# Patient Record
Sex: MALE | Race: WHITE | NOT HISPANIC OR LATINO | Employment: FULL TIME | URBAN - METROPOLITAN AREA
[De-identification: names, ages, dates, MRNs, and addresses within clinical notes are randomized per-mention and may not be internally consistent; named-entity substitution may affect disease eponyms.]

---

## 2021-08-02 ENCOUNTER — LAB REQUISITION (OUTPATIENT)
Dept: LAB | Facility: CLINIC | Age: 31
End: 2021-08-02

## 2021-08-02 PROCEDURE — 86481 TB AG RESPONSE T-CELL SUSP: CPT | Performed by: INTERNAL MEDICINE

## 2021-08-04 LAB
GAMMA INTERFERON BACKGROUND BLD IA-ACNC: 0.41 IU/ML
M TB IFN-G BLD-IMP: NEGATIVE
M TB IFN-G CD4+ BCKGRND COR BLD-ACNC: 9.59 IU/ML
MITOGEN IGNF BCKGRD COR BLD-ACNC: 0.14 IU/ML
MITOGEN IGNF BCKGRD COR BLD-ACNC: 0.2 IU/ML
QUANTIFERON MITOGEN: 10 IU/ML
QUANTIFERON NIL TUBE: 0.41 IU/ML
QUANTIFERON TB1 TUBE: 0.55 IU/ML
QUANTIFERON TB2 TUBE: 0.61

## 2022-08-28 ENCOUNTER — HOSPITAL ENCOUNTER (EMERGENCY)
Facility: CLINIC | Age: 32
Discharge: HOME | End: 2022-08-29
Attending: INTERNAL MEDICINE | Admitting: INTERNAL MEDICINE
Payer: COMMERCIAL

## 2022-08-28 DIAGNOSIS — R00.2 PALPITATIONS: ICD-10-CM

## 2022-08-28 LAB
ALBUMIN SERPL BCG-MCNC: 4.9 G/DL (ref 3.5–5.2)
ALP SERPL-CCNC: 59 U/L (ref 40–129)
ALT SERPL W P-5'-P-CCNC: 33 U/L (ref 10–50)
ANION GAP SERPL CALCULATED.3IONS-SCNC: 10 MMOL/L (ref 7–15)
AST SERPL W P-5'-P-CCNC: 29 U/L (ref 10–50)
BASOPHILS # BLD AUTO: 0 10E3/UL (ref 0–0.2)
BASOPHILS NFR BLD AUTO: 0 %
BILIRUB SERPL-MCNC: 0.3 MG/DL
BUN SERPL-MCNC: 12.6 MG/DL (ref 6–20)
CALCIUM SERPL-MCNC: 10 MG/DL (ref 8.6–10)
CHLORIDE SERPL-SCNC: 103 MMOL/L (ref 98–107)
CREAT SERPL-MCNC: 1.22 MG/DL (ref 0.67–1.17)
DEPRECATED HCO3 PLAS-SCNC: 28 MMOL/L (ref 22–29)
EOSINOPHIL # BLD AUTO: 0.1 10E3/UL (ref 0–0.7)
EOSINOPHIL NFR BLD AUTO: 1 %
ERYTHROCYTE [DISTWIDTH] IN BLOOD BY AUTOMATED COUNT: 12.8 % (ref 10–15)
GFR SERPL CREATININE-BSD FRML MDRD: 82 ML/MIN/1.73M2
GLUCOSE SERPL-MCNC: 93 MG/DL (ref 70–99)
HCT VFR BLD AUTO: 45.7 % (ref 40–53)
HGB BLD-MCNC: 15.1 G/DL (ref 13.3–17.7)
HOLD SPECIMEN: NORMAL
HOLD SPECIMEN: NORMAL
IMM GRANULOCYTES # BLD: 0 10E3/UL
IMM GRANULOCYTES NFR BLD: 0 %
LYMPHOCYTES # BLD AUTO: 2.2 10E3/UL (ref 0.8–5.3)
LYMPHOCYTES NFR BLD AUTO: 25 %
MCH RBC QN AUTO: 27.7 PG (ref 26.5–33)
MCHC RBC AUTO-ENTMCNC: 33 G/DL (ref 31.5–36.5)
MCV RBC AUTO: 84 FL (ref 78–100)
MONOCYTES # BLD AUTO: 0.9 10E3/UL (ref 0–1.3)
MONOCYTES NFR BLD AUTO: 10 %
NEUTROPHILS # BLD AUTO: 5.8 10E3/UL (ref 1.6–8.3)
NEUTROPHILS NFR BLD AUTO: 64 %
NRBC # BLD AUTO: 0 10E3/UL
NRBC BLD AUTO-RTO: 0 /100
PLATELET # BLD AUTO: 218 10E3/UL (ref 150–450)
POTASSIUM SERPL-SCNC: 3.8 MMOL/L (ref 3.4–5.3)
PROT SERPL-MCNC: 8 G/DL (ref 6.4–8.3)
RBC # BLD AUTO: 5.46 10E6/UL (ref 4.4–5.9)
SODIUM SERPL-SCNC: 141 MMOL/L (ref 136–145)
TROPONIN T SERPL HS-MCNC: <6 NG/L
WBC # BLD AUTO: 9 10E3/UL (ref 4–11)

## 2022-08-28 PROCEDURE — 93005 ELECTROCARDIOGRAM TRACING: CPT | Performed by: INTERNAL MEDICINE

## 2022-08-28 PROCEDURE — 85025 COMPLETE CBC W/AUTO DIFF WBC: CPT | Performed by: INTERNAL MEDICINE

## 2022-08-28 PROCEDURE — 82040 ASSAY OF SERUM ALBUMIN: CPT | Performed by: INTERNAL MEDICINE

## 2022-08-28 PROCEDURE — 36415 COLL VENOUS BLD VENIPUNCTURE: CPT | Performed by: EMERGENCY MEDICINE

## 2022-08-28 PROCEDURE — 99284 EMERGENCY DEPT VISIT MOD MDM: CPT | Mod: 25 | Performed by: INTERNAL MEDICINE

## 2022-08-28 PROCEDURE — 84484 ASSAY OF TROPONIN QUANT: CPT | Performed by: INTERNAL MEDICINE

## 2022-08-28 PROCEDURE — 85025 COMPLETE CBC W/AUTO DIFF WBC: CPT | Performed by: EMERGENCY MEDICINE

## 2022-08-28 PROCEDURE — 80053 COMPREHEN METABOLIC PANEL: CPT | Performed by: INTERNAL MEDICINE

## 2022-08-28 PROCEDURE — 93010 ELECTROCARDIOGRAM REPORT: CPT | Mod: 59 | Performed by: INTERNAL MEDICINE

## 2022-08-28 PROCEDURE — 84443 ASSAY THYROID STIM HORMONE: CPT | Performed by: INTERNAL MEDICINE

## 2022-08-28 PROCEDURE — 83735 ASSAY OF MAGNESIUM: CPT | Performed by: INTERNAL MEDICINE

## 2022-08-28 ASSESSMENT — ENCOUNTER SYMPTOMS
CHILLS: 0
WEAKNESS: 0
ABDOMINAL PAIN: 0
WHEEZING: 0
PALPITATIONS: 1
NUMBNESS: 0
NAUSEA: 0
SHORTNESS OF BREATH: 0
HEADACHES: 0
COUGH: 0
ADENOPATHY: 0
LIGHT-HEADEDNESS: 0
FEVER: 0
CONFUSION: 0
DIFFICULTY URINATING: 0

## 2022-08-28 ASSESSMENT — ACTIVITIES OF DAILY LIVING (ADL): ADLS_ACUITY_SCORE: 35

## 2022-08-29 ENCOUNTER — APPOINTMENT (OUTPATIENT)
Dept: CARDIOLOGY | Facility: CLINIC | Age: 32
End: 2022-08-29
Attending: INTERNAL MEDICINE
Payer: COMMERCIAL

## 2022-08-29 VITALS
RESPIRATION RATE: 16 BRPM | OXYGEN SATURATION: 95 % | TEMPERATURE: 98.7 F | HEART RATE: 92 BPM | SYSTOLIC BLOOD PRESSURE: 111 MMHG | DIASTOLIC BLOOD PRESSURE: 79 MMHG

## 2022-08-29 LAB
ATRIAL RATE - MUSE: 91 BPM
DIASTOLIC BLOOD PRESSURE - MUSE: NORMAL MMHG
INTERPRETATION ECG - MUSE: NORMAL
MAGNESIUM SERPL-MCNC: 2.2 MG/DL (ref 1.7–2.3)
P AXIS - MUSE: 38 DEGREES
PR INTERVAL - MUSE: 148 MS
QRS DURATION - MUSE: 84 MS
QT - MUSE: 356 MS
QTC - MUSE: 437 MS
R AXIS - MUSE: 27 DEGREES
SYSTOLIC BLOOD PRESSURE - MUSE: NORMAL MMHG
T AXIS - MUSE: 34 DEGREES
TSH SERPL DL<=0.005 MIU/L-ACNC: 3.89 UIU/ML (ref 0.3–4.2)
VENTRICULAR RATE- MUSE: 91 BPM

## 2022-08-29 PROCEDURE — 93242 EXT ECG>48HR<7D RECORDING: CPT

## 2022-08-29 PROCEDURE — 93248 EXT ECG>7D<15D REV&INTERPJ: CPT | Performed by: INTERNAL MEDICINE

## 2022-08-29 ASSESSMENT — ACTIVITIES OF DAILY LIVING (ADL): ADLS_ACUITY_SCORE: 35

## 2022-08-29 NOTE — ED TRIAGE NOTES
"Pt c/o pounding feeling/waves of \"weird feeling in chest\", had it yesterday and today. Pt was listening with stethoscope and thought heart rate paused. Last wave occurred at 915pm.       "

## 2022-08-29 NOTE — ED PROVIDER NOTES
ED Provider Note  [facility]      History     Chief Complaint   Patient presents with     Chest Pain     HPI  Yoni Zaman is a 32 year old male who presents with 2 days of intermittent precordial thump sensation. This occurs once every hour or 2. He has no chest pain, shortness of breath, lightheadedness, syncope. He has no leg pain or swelling. He has not been ill recently. He has no personal heart history.There is some ASCVD in the family. He has not experienced similar palpitations in the past. He has no fever, chills, URI symptoms, cough, nausea, vomiting, abdominal pain, leg pain or swelling.    No past medical history on file.    No past surgical history on file.    No family history on file.    Social History     Tobacco Use     Smoking status: Not on file     Smokeless tobacco: Not on file   Substance Use Topics     Alcohol use: Not on file          Review of Systems   Constitutional: Negative for chills and fever.   HENT: Negative for congestion.    Eyes: Negative for visual disturbance.   Respiratory: Negative for cough, shortness of breath and wheezing.    Cardiovascular: Positive for palpitations. Negative for chest pain.   Gastrointestinal: Negative for abdominal pain and nausea.   Genitourinary: Negative for difficulty urinating.   Skin: Negative for rash.   Neurological: Negative for weakness, light-headedness, numbness and headaches.   Hematological: Negative for adenopathy.   Psychiatric/Behavioral: Negative for confusion.         Physical Exam   BP: (!) 147/99  Pulse: 98  Temp: 98.7  F (37.1  C)  Resp: 16  SpO2: 99 %  Physical Exam  Vitals and nursing note reviewed.   Constitutional:       Appearance: He is well-developed.   HENT:      Head: Normocephalic and atraumatic.      Right Ear: External ear normal.      Left Ear: External ear normal.      Mouth/Throat:      Mouth: Mucous membranes are moist.   Eyes:      General: No scleral icterus.     Extraocular Movements:  Extraocular movements intact.      Pupils: Pupils are equal, round, and reactive to light.   Cardiovascular:      Rate and Rhythm: Normal rate and regular rhythm. Occasional extrasystoles are present.     Heart sounds: S1 normal and S2 normal. No murmur heard.    No friction rub.   Pulmonary:      Effort: Pulmonary effort is normal.      Breath sounds: Normal breath sounds. No wheezing or rales.   Abdominal:      General: Abdomen is flat.      Palpations: Abdomen is soft.      Tenderness: There is no abdominal tenderness. There is no guarding.   Musculoskeletal:      Right lower leg: No edema.      Left lower leg: No edema.   Neurological:      General: No focal deficit present.      Mental Status: He is alert and oriented to person, place, and time.   Psychiatric:         Mood and Affect: Mood normal.         Behavior: Behavior normal.         ED Course      Procedures            EKG Interpretation:      Interpreted by JASWANT SALVADOR MD  Time reviewed: 2148  Symptoms at time of EKG: palpitations   Rhythm: normal sinus   Rate: Normal  Axis: Normal  Ectopy: none  Conduction: normal  ST Segments/ T Waves: No ST-T wave changes and No acute ischemic changes  Q Waves: none  Comparison to prior: No old EKG available    Clinical Impression: normal EKG         Results for orders placed or performed during the hospital encounter of 08/28/22   Troponin T, High Sensitivity     Status: Normal   Result Value Ref Range    Troponin T, High Sensitivity <6 <=22 ng/L   Comprehensive metabolic panel     Status: Abnormal   Result Value Ref Range    Sodium 141 136 - 145 mmol/L    Potassium 3.8 3.4 - 5.3 mmol/L    Creatinine 1.22 (H) 0.67 - 1.17 mg/dL    Urea Nitrogen 12.6 6.0 - 20.0 mg/dL    Chloride 103 98 - 107 mmol/L    Carbon Dioxide (CO2) 28 22 - 29 mmol/L    Anion Gap 10 7 - 15 mmol/L    Glucose 93 70 - 99 mg/dL    Calcium 10.0 8.6 - 10.0 mg/dL    Protein Total 8.0 6.4 - 8.3 g/dL    Albumin 4.9 3.5 - 5.2 g/dL    Bilirubin Total  0.3 <=1.2 mg/dL    Alkaline Phosphatase 59 40 - 129 U/L    AST 29 10 - 50 U/L    ALT 33 10 - 50 U/L    GFR Estimate 82 >60 mL/min/1.73m2   [place] Draw     Status: None    Narrative    The following orders were created for panel order [place] Draw.  Procedure                               Abnormality         Status                     ---------                               -----------         ------                     Extra Blue Top Tube[[id number]]                              Final result               Extra Red Top Tube[[id number]]                               Final result                 Please view results for these tests on the individual orders.   CBC with platelets and differential     Status: None   Result Value Ref Range    WBC Count 9.0 4.0 - 11.0 10e3/uL    RBC Count 5.46 4.40 - 5.90 10e6/uL    Hemoglobin 15.1 13.3 - 17.7 g/dL    Hematocrit 45.7 40.0 - 53.0 %    MCV 84 78 - 100 fL    MCH 27.7 26.5 - 33.0 pg    MCHC 33.0 31.5 - 36.5 g/dL    RDW 12.8 10.0 - 15.0 %    Platelet Count 218 150 - 450 10e3/uL    % Neutrophils 64 %    % Lymphocytes 25 %    % Monocytes 10 %    % Eosinophils 1 %    % Basophils 0 %    % Immature Granulocytes 0 %    NRBCs per 100 WBC 0 <1 /100    Absolute Neutrophils 5.8 1.6 - 8.3 10e3/uL    Absolute Lymphocytes 2.2 0.8 - 5.3 10e3/uL    Absolute Monocytes 0.9 0.0 - 1.3 10e3/uL    Absolute Eosinophils 0.1 0.0 - 0.7 10e3/uL    Absolute Basophils 0.0 0.0 - 0.2 10e3/uL    Absolute Immature Granulocytes 0.0 <=0.4 10e3/uL    Absolute NRBCs 0.0 10e3/uL   Extra Blue Top Tube     Status: None   Result Value Ref Range    Hold Specimen JIC    Extra Red Top Tube     Status: None   Result Value Ref Range    Hold Specimen JIC    Magnesium     Status: Normal   Result Value Ref Range    Magnesium 2.2 1.7 - 2.3 mg/dL   TSH with free T4 reflex     Status: Normal   Result Value Ref Range    TSH 3.89 0.30 - 4.20 uIU/mL   EKG 12 lead     Status: None (Preliminary result)   Result Value Ref Range     Systolic Blood Pressure  mmHg    Diastolic Blood Pressure  mmHg    Ventricular Rate 91 BPM    Atrial Rate 91 BPM    TX Interval 148 ms    QRS Duration 84 ms     ms    QTc 437 ms    P Axis 38 degrees    R AXIS 27 degrees    T Axis 34 degrees    Interpretation ECG Sinus rhythm  Normal ECG      CBC with platelets differential     Status: None    Narrative    The following orders were created for panel order CBC with platelets differential.  Procedure                               Abnormality         Status                     ---------                               -----------         ------                     CBC with platelets and d...[[id number]]                      Final result                 Please view results for these tests on the individual orders.     Medications - No data to display     Assessments & Plan (with Medical Decision Making)   Impression;  Generally healthy young man presents with 2 days of intermittent palpitations and rapid heart rates. He had a few PAC's on monitor in the ED. He has normal EKG, troponin, electrolytes and TSH. I hear no murmurs. Vital signs are normal. At this point I think it would be most reasonable to send him out with a 7 day Ziopatch and refer to cardiology clinic for resting echo and review of the ambulatory monitor. I think ischemic cause for his symptoms is unlikely. Intermittent tachyarrhythmia remains a consideration.    I have reviewed the nursing notes. I have reviewed the findings, diagnosis, plan and need for follow up with the patient.    New Prescriptions    No medications on file       Final diagnoses:   Palpitations       --  Nehemias Jiménez  [facility] EMERGENCY DEPARTMENT  8/28/2022     Nehemias Jiménez MD  08/29/22 0052

## 2022-08-29 NOTE — DISCHARGE INSTRUCTIONS
Javi monitor.  Follow up [facility] heart clinic.[phone]  Return for worsening symptoms, chest pain, fainting episode.

## 2022-09-22 ENCOUNTER — OFFICE VISIT (OUTPATIENT)
Dept: CARDIOLOGY | Facility: CLINIC | Age: 32
End: 2022-09-22
Payer: COMMERCIAL

## 2022-09-22 VITALS
HEIGHT: 71 IN | WEIGHT: 209.5 LBS | HEART RATE: 101 BPM | OXYGEN SATURATION: 99 % | SYSTOLIC BLOOD PRESSURE: 121 MMHG | DIASTOLIC BLOOD PRESSURE: 85 MMHG | BODY MASS INDEX: 29.33 KG/M2

## 2022-09-22 DIAGNOSIS — F41.1 GENERALIZED ANXIETY DISORDER: ICD-10-CM

## 2022-09-22 DIAGNOSIS — R00.2 PALPITATIONS: Primary | ICD-10-CM

## 2022-09-22 PROCEDURE — 99203 OFFICE O/P NEW LOW 30 MIN: CPT | Performed by: INTERNAL MEDICINE

## 2022-09-22 NOTE — PROGRESS NOTES
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  He is here to discuss palpitations.  In August he started having palpitations which he describes as thumping in his chest.  It was intermittent, nonexertional.  It is not associate with any chest pain or shortness of breath or dizziness.  He got concerned and went to the emergency room.  He was evaluated and recommended to get a Zio patch monitor.  Zio patch monitor was done and I reviewed the results and the images.  It showed rare PACs and PVCs.    Over the last 1 to 2 weeks, his symptoms are somewhat better.  However he is under significant stress in his personal life as well as at work.  He actually was in tears couple times during this visit.  My nurse practitioner also was with me.    He he is also meeting with his family doctor to discuss possibility of anxiety and depression.    He has no known previous cardiac history.  His grandfather may have had some heart disease.  He has never had syncope.    EKG was reviewed by me and revealed sinus rhythm without ischemic changes    Exam, regular rate and rhythm without murmurs.  Chest was clear to auscultation.    Impression  1.  Palpitations, sensation of thumping.  Zio patch monitor did not show any significant arrhythmias.  Symptoms are better.  I recommend echocardiogram rule out structural heart disease.  I suspect his symptoms are mainly related to anxiety and stress and he will have to manage that with consultation with his primary care physician.  He might need some counseling and antianxiety therapy.  We will call him with the results of the echocardiogram.    2.  Possible general anxiety disorder, follow-up with primary care physician    If echocardiogram is normal, he can return to his primary care physician for follow-up.  I have asked him to call if there is worsening palpitations.    Thank you for allowing us to personally care of  "his nice patient    Sincerely,    Conor Porter MD        Orders Placed This Encounter   Procedures     Lipid panel reflex to direct LDL Fasting     Follow-Up with Cardiology GERMAINE     Echocardiogram Complete       No orders of the defined types were placed in this encounter.      There are no discontinued medications.      Encounter Diagnoses   Name Primary?     Palpitations Yes     Generalized anxiety disorder        CURRENT MEDICATIONS:  No current outpatient medications on file.       ALLERGIES   No Known Allergies    PAST MEDICAL HISTORY:  Reviewed. None    PAST SURGICAL HISTORY:  Reviewed.    FAMILY HISTORY:  Family History   Problem Relation Age of Onset     Hypertension Mother      Hypertension Maternal Grandfather        SOCIAL HISTORY:  Social History     Socioeconomic History     Marital status: Single     Spouse name: None     Number of children: None     Years of education: None     Highest education level: None   Tobacco Use     Smoking status: Never Smoker     Smokeless tobacco: Never Used   Substance and Sexual Activity     Alcohol use: Not Currently     Comment: typically twice a week     Drug use: Never       Review of Systems:  Skin:          Eyes:         ENT:         Respiratory:  Negative shortness of breath;dyspnea on exertion;cough;sleep apnea     Cardiovascular:  chest pain;Negative;edema;dizziness;lightheadedness;fatigue palpitations;Positive for patient states when feeling palpitations he feels no SOB, but feels like he needs to take a deep breath  Gastroenterology:        Genitourinary:         Musculoskeletal:         Neurologic:         Psychiatric:         Heme/Lymph/Imm:  Negative allergies    Endocrine:  Negative        Physical Exam:  Vitals: /85 (BP Location: Left arm, Patient Position: Sitting)   Pulse 101   Ht 1.803 m (5' 11\")   Wt 95 kg (209 lb 8 oz)   SpO2 99%   BMI 29.22 kg/m      Constitutional:  in no acute distress        Skin:  warm and dry to the touch      "     Head:           Eyes:  sclera white        Lymph:      ENT:           Neck:  JVP normal        Respiratory:  clear to auscultation         Cardiac: regular rhythm;normal S1 and S2     no presence of murmur                                                   GI:  not assessed this visit        Extremities and Muscular Skeletal:  no edema              Neurological:  no gross motor deficits        Psych:  Alert and Oriented x 3        CC  No referring provider defined for this encounter.

## 2022-09-22 NOTE — LETTER
9/22/2022    Hua Kim MD, MD  [address]  [facility] [address]    RE: Yoni Zaman       Dear Colleague,     I had the pleasure of seeing Yoni Zaman in the [facility].  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  He is here to discuss palpitations.  In August he started having palpitations which he describes as thumping in his chest.  It was intermittent, nonexertional.  It is not associate with any chest pain or shortness of breath or dizziness.  He got concerned and went to the emergency room.  He was evaluated and recommended to get a Zio patch monitor.  Zio patch monitor was done and I reviewed the results and the images.  It showed rare PACs and PVCs.    Over the last 1 to 2 weeks, his symptoms are somewhat better.  However he is under significant stress in his personal life as well as at work.  He actually was in tears couple times during this visit.  My nurse practitioner also was with me.    He he is also meeting with his family doctor to discuss possibility of anxiety and depression.    He has no known previous cardiac history.  His grandfather may have had some heart disease.  He has never had syncope.    EKG was reviewed by me and revealed sinus rhythm without ischemic changes    Exam, regular rate and rhythm without murmurs.  Chest was clear to auscultation.    Impression  1.  Palpitations, sensation of thumping.  Zio patch monitor did not show any significant arrhythmias.  Symptoms are better.  I recommend echocardiogram rule out structural heart disease.  I suspect his symptoms are mainly related to anxiety and stress and he will have to manage that with consultation with his primary care physician.  He might need some counseling and antianxiety therapy.  We will call him with the results of the echocardiogram.    2.  Possible general anxiety disorder, follow-up with  primary care physician    If echocardiogram is normal, he can return to his primary care physician for follow-up.  I have asked him to call if there is worsening palpitations.    Thank you for allowing us to personally care of his nice patient    Sincerely,    Conor Porter MD        Orders Placed This Encounter   Procedures     Lipid panel reflex to direct LDL Fasting     Follow-Up with Cardiology GERMAINE     Echocardiogram Complete       No orders of the defined types were placed in this encounter.      There are no discontinued medications.      Encounter Diagnoses   Name Primary?     Palpitations Yes     Generalized anxiety disorder        CURRENT MEDICATIONS:  No current outpatient medications on file.       ALLERGIES   No Known Allergies    PAST MEDICAL HISTORY:  Reviewed. None    PAST SURGICAL HISTORY:  Reviewed.    FAMILY HISTORY:  Family History   Problem Relation Age of Onset     Hypertension Mother      Hypertension Maternal Grandfather        SOCIAL HISTORY:  Social History     Socioeconomic History     Marital status: Single     Spouse name: None     Number of children: None     Years of education: None     Highest education level: None   Tobacco Use     Smoking status: Never Smoker     Smokeless tobacco: Never Used   Substance and Sexual Activity     Alcohol use: Not Currently     Comment: typically twice a week     Drug use: Never       Review of Systems:  Skin:          Eyes:         ENT:         Respiratory:  Negative shortness of breath;dyspnea on exertion;cough;sleep apnea     Cardiovascular:  chest pain;Negative;edema;dizziness;lightheadedness;fatigue palpitations;Positive for patient states when feeling palpitations he feels no SOB, but feels like he needs to take a deep breath  Gastroenterology:        Genitourinary:         Musculoskeletal:         Neurologic:         Psychiatric:         Heme/Lymph/Imm:  Negative allergies    Endocrine:  Negative        Physical Exam:  Vitals: /85 (BP  "Location: Left arm, Patient Position: Sitting)   Pulse 101   Ht 1.803 m (5' 11\")   Wt 95 kg (209 lb 8 oz)   SpO2 99%   BMI 29.22 kg/m      Constitutional:  in no acute distress        Skin:  warm and dry to the touch          Head:           Eyes:  sclera white        Lymph:      ENT:           Neck:  JVP normal        Respiratory:  clear to auscultation         Cardiac: regular rhythm;normal S1 and S2     no presence of murmur                                                   GI:  not assessed this visit        Extremities and Muscular Skeletal:  no edema              Neurological:  no gross motor deficits        Psych:  Alert and Oriented x 3        CC  No referring provider defined for this encounter.    Thank you for allowing me to participate in the care of your patient.      Sincerely,     Conor Porter MD     [facility]  "

## 2022-09-29 ENCOUNTER — HOSPITAL ENCOUNTER (OUTPATIENT)
Dept: CARDIOLOGY | Facility: CLINIC | Age: 32
Discharge: HOME | End: 2022-09-29
Attending: NURSE PRACTITIONER | Admitting: NURSE PRACTITIONER
Payer: COMMERCIAL

## 2022-09-29 DIAGNOSIS — R00.2 PALPITATIONS: ICD-10-CM

## 2022-09-29 LAB — LVEF ECHO: NORMAL

## 2022-09-29 PROCEDURE — 93306 TTE W/DOPPLER COMPLETE: CPT | Mod: 26 | Performed by: INTERNAL MEDICINE

## 2022-09-29 PROCEDURE — 93306 TTE W/DOPPLER COMPLETE: CPT

## 2022-10-03 ENCOUNTER — TELEPHONE (OUTPATIENT)
Dept: CARDIOLOGY | Facility: CLINIC | Age: 32
End: 2022-10-03

## 2022-10-03 ENCOUNTER — HEALTH MAINTENANCE LETTER (OUTPATIENT)
Age: 32
End: 2022-10-03

## 2022-10-03 NOTE — TELEPHONE ENCOUNTER
Echo results:  ______________________________________________________________________________  Interpretation Summary     1. The left ventricle is normal in structure, function and size. The visual  ejection fraction is estimated at 60%.  2. The right ventricle is normal in structure, function and size.  3. No valve disease.    HR: 73  BP: 140/84 mmHg   No previous echo for comparison.  PILAR Roa RN

## 2022-10-03 NOTE — TELEPHONE ENCOUNTER
Called out to Pt left phone message testing stable please call for more specifics and results in my chart. Pt voice mail does not state owner of phone so did not leave detailed message. PILAR Roa rN

## 2022-10-04 NOTE — RESULT ENCOUNTER NOTE
The results of the echo look good; EF is 60%, no valve disease.  I have him scheduled to see me back in December if his symptoms of palpitations remain stable.

## 2022-11-10 ENCOUNTER — OFFICE VISIT (OUTPATIENT)
Dept: OPTOMETRY | Facility: CLINIC | Age: 32
End: 2022-11-10
Payer: COMMERCIAL

## 2022-11-10 DIAGNOSIS — H52.03 HYPERMETROPIA, BILATERAL: ICD-10-CM

## 2022-11-10 DIAGNOSIS — Z01.01 ENCOUNTER FOR EXAMINATION OF EYES AND VISION WITH ABNORMAL FINDINGS: Primary | ICD-10-CM

## 2022-11-10 DIAGNOSIS — Z98.890 S/P LASIK SURGERY: ICD-10-CM

## 2022-11-10 DIAGNOSIS — H52.221 REGULAR ASTIGMATISM OF RIGHT EYE: ICD-10-CM

## 2022-11-10 PROCEDURE — 92004 COMPRE OPH EXAM NEW PT 1/>: CPT | Performed by: OPTOMETRIST

## 2022-11-10 PROCEDURE — 92015 DETERMINE REFRACTIVE STATE: CPT | Performed by: OPTOMETRIST

## 2022-11-10 ASSESSMENT — REFRACTION_MANIFEST
OD_CYLINDER: +0.25
OS_SPHERE: +0.25
OD_SPHERE: +0.25
OD_AXIS: 021
OD_SPHERE: +0.25
OD_CYLINDER: +0.50
OS_CYLINDER: SPHERE
OS_CYLINDER: SPHERE
OD_AXIS: 180
METHOD_AUTOREFRACTION: 1
OS_SPHERE: +0.50

## 2022-11-10 ASSESSMENT — TONOMETRY
IOP_METHOD: APPLANATION
OD_IOP_MMHG: 15
OS_IOP_MMHG: 15

## 2022-11-10 ASSESSMENT — CONF VISUAL FIELD
OS_INFERIOR_TEMPORAL_RESTRICTION: 0
METHOD: COUNTING FINGERS
OD_NORMAL: 1
OS_NORMAL: 1
OD_INFERIOR_TEMPORAL_RESTRICTION: 0
OS_SUPERIOR_TEMPORAL_RESTRICTION: 0
OD_SUPERIOR_TEMPORAL_RESTRICTION: 0
OS_INFERIOR_NASAL_RESTRICTION: 0
OS_SUPERIOR_NASAL_RESTRICTION: 0
OD_INFERIOR_NASAL_RESTRICTION: 0
OD_SUPERIOR_NASAL_RESTRICTION: 0

## 2022-11-10 ASSESSMENT — KERATOMETRY
OS_AXISANGLE2_DEGREES: 151
OS_K2POWER_DIOPTERS: 36.75
OS_AXISANGLE_DEGREES: 061
OD_AXISANGLE_DEGREES: 090
OS_K1POWER_DIOPTERS: 36.50
OD_K1POWER_DIOPTERS: 35.00
OD_AXISANGLE2_DEGREES: 180
OD_K2POWER_DIOPTERS: 35.25

## 2022-11-10 ASSESSMENT — VISUAL ACUITY
OS_SC: 20/20
METHOD: SNELLEN - LINEAR
OS_SC: 20/20
OD_SC: 20/20
OD_SC: 20/20

## 2022-11-10 ASSESSMENT — CUP TO DISC RATIO
OS_RATIO: 0.35
OD_RATIO: 0.3

## 2022-11-10 ASSESSMENT — SLIT LAMP EXAM - LIDS
COMMENTS: NORMAL
COMMENTS: NORMAL

## 2022-11-10 ASSESSMENT — EXTERNAL EXAM - RIGHT EYE: OD_EXAM: NORMAL

## 2022-11-10 ASSESSMENT — EXTERNAL EXAM - LEFT EYE: OS_EXAM: NORMAL

## 2022-11-10 NOTE — PROGRESS NOTES
Chief Complaint   Patient presents with     Annual Eye Exam     Refractive Surgery Evaluation      -S/P Lasik each eye - 12/2021 - [facility]     Last Eye Exam: 2019(?)  Dilated Previously: Yes, side effects of dilation explained today    What are you currently using to see?  does not use glasses or contacts       Distance Vision Acuity: More trouble with glare at night - still seeing starbursts around car lights but that has been ongoing since Lasik     Also notices a light gray spot floating in his vision at times; has noticed intermittently for several years    Near Vision Acuity: Satisfied with vision while reading and using computer unaided    Eye Comfort: good - occasional allergy symptoms   Do you use eye drops? : Yes: AT's very infrequently  Occupation or Hobbies: Nurse     Evette Cohen        Medical, surgical and family histories reviewed and updated 11/10/2022.       OBJECTIVE: See Ophthalmology exam    ASSESSMENT:    ICD-10-CM    1. Encounter for examination of eyes and vision with abnormal findings  Z01.01       2. S/P LASIK surgery  Z98.890       3. Regular astigmatism of right eye  H52.221       4. Hypermetropia, bilateral  H52.03           PLAN:     Patient Instructions   Floaters are small specks or clouds that move in your vision. They may appear to dart away when you try to look directly at them. They are most noticeable in bright light when looking at a blank wall, a solid colored surface, or the radha.    These happen as the vitreous gel ( the fluid that fills the eyeball), starts to become more liquified and shrinks to form clumps or strands. The gel then pulls away from the back of the retina leaving a clump of tissue where the vitreous was previously attached, causing a posterior vitreous detachment.   This is more common with people that are nearsighted or have undergone certain ocular surgeries, inflammatory conditions or experienced trauma.  You should always be checked by an eye doctor  right away if you have a sudden change in floaters, flashes, or change/ loss in peripheral vision. This could indicate a retinal tear, hole or detachment that could lead to permanent vision loss if not treated.    No glasses prescription necessary.     Return in 1 year for a comprehensive eye exam, or sooner if needed.      The effects of the dilating drops last for 4- 6 hours.  You will be more sensitive to light and vision will be blurry up close.  Mydriatic sunglasses were given if needed.     Albert Babin, OD  [address] [place]  [address]    [phone]

## 2022-11-10 NOTE — LETTER
11/10/2022         RE: Yoni Zaman  [address]  [facility] [address]        Dear Colleague,    Thank you for referring your patient, Yoni Zaman, to the [facility]. Please see a copy of my visit note below.    Chief Complaint   Patient presents with     Annual Eye Exam     Refractive Surgery Evaluation      -S/P Lasik each eye - 12/2021 - [facility]     Last Eye Exam: 2019(?)  Dilated Previously: Yes, side effects of dilation explained today    What are you currently using to see?  does not use glasses or contacts       Distance Vision Acuity: More trouble with glare at night - still seeing starbursts around car lights but that has been ongoing since Lasik     Also notices a light gray spot floating in his vision at times; has noticed intermittently for several years    Near Vision Acuity: Satisfied with vision while reading and using computer unaided    Eye Comfort: good - occasional allergy symptoms   Do you use eye drops? : Yes: AT's very infrequently  Occupation or Hobbies: Nurse     Evette Cohen        Medical, surgical and family histories reviewed and updated 11/10/2022.       OBJECTIVE: See Ophthalmology exam    ASSESSMENT:    ICD-10-CM    1. Encounter for examination of eyes and vision with abnormal findings  Z01.01       2. S/P LASIK surgery  Z98.890       3. Regular astigmatism of right eye  H52.221       4. Hypermetropia, bilateral  H52.03           PLAN:     Patient Instructions   Floaters are small specks or clouds that move in your vision. They may appear to dart away when you try to look directly at them. They are most noticeable in bright light when looking at a blank wall, a solid colored surface, or the radha.    These happen as the vitreous gel ( the fluid that fills the eyeball), starts to become more liquified and shrinks to form clumps or strands. The gel then pulls away from the back of the retina leaving a clump of tissue where the vitreous  was previously attached, causing a posterior vitreous detachment.   This is more common with people that are nearsighted or have undergone certain ocular surgeries, inflammatory conditions or experienced trauma.  You should always be checked by an eye doctor right away if you have a sudden change in floaters, flashes, or change/ loss in peripheral vision. This could indicate a retinal tear, hole or detachment that could lead to permanent vision loss if not treated.    No glasses prescription necessary.     Return in 1 year for a comprehensive eye exam, or sooner if needed.      The effects of the dilating drops last for 4- 6 hours.  You will be more sensitive to light and vision will be blurry up close.  Mydriatic sunglasses were given if needed.     Albert Babin OD  [address] [facility] [place]  [address] [phone]            Again, thank you for allowing me to participate in the care of your patient.        Sincerely,        Albert Babin OD

## 2022-11-10 NOTE — PATIENT INSTRUCTIONS
Floaters are small specks or clouds that move in your vision. They may appear to dart away when you try to look directly at them. They are most noticeable in bright light when looking at a blank wall, a solid colored surface, or the radha.    These happen as the vitreous gel ( the fluid that fills the eyeball), starts to become more liquified and shrinks to form clumps or strands. The gel then pulls away from the back of the retina leaving a clump of tissue where the vitreous was previously attached, causing a posterior vitreous detachment.   This is more common with people that are nearsighted or have undergone certain ocular surgeries, inflammatory conditions or experienced trauma.  You should always be checked by an eye doctor right away if you have a sudden change in floaters, flashes, or change/ loss in peripheral vision. This could indicate a retinal tear, hole or detachment that could lead to permanent vision loss if not treated.    No glasses prescription necessary.     Return in 1 year for a comprehensive eye exam, or sooner if needed.      The effects of the dilating drops last for 4- 6 hours.  You will be more sensitive to light and vision will be blurry up close.  Mydriatic sunglasses were given if needed.     Albert Babin, OD  [facility]  [address] [place] [address]. DONG Mortensen  [id number]    [phone]

## 2022-12-13 NOTE — PROGRESS NOTES
~Cardiology Clinic Visit~    Primary Cardiologist: Dr. Porter  Last Office Visit: 9/22/22  Reason for visit: 3-month follow up    I had the pleasure of seeing Yoni Zaman in Cardiology clinic today.    History of Present Illness    Yoni Zaman is a pleasant 32 year old male with a past medical history notable for palpitations, anxiety.  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       In August he started having palpitations which he describes as thumping in his chest.  It was intermittent, nonexertional.  It is not associate with any chest pain or shortness of breath or dizziness.  He got concerned and went to the emergency room.  He was evaluated and recommended to get a Zio patch monitor.  Zio patch monitor showed SR with rare PACs and PVCs, no arrhythmias or sustained dysrhythmias.     His symptoms had gradually improved when he first presented to clinic.  He since met with his family doctor to discuss possibility of anxiety and depression.     He has no known previous cardiac history.  His grandfather may have had some heart disease.  He has never had syncope.     Interval 12/15/22    He is feeling well, has really not had any recurrence of his palpitations.  Only notices his heart every once in a while - says he feels like it is racing, but now has a Fitbit watch so he can see his HR in real time.  This has been reassuring for him to see a normal HR when he feels anxious.  Denies CP, SOB.  He has some reproducible MSK pain that has been ongoing, but is not associated with any cardiac symptoms or the equivalent.  He has recently started taking venlafaxine per his PCP, has taken it for 2-weeks at this point.  No adverse side effects noted, HR remains stable.    We reviewed the results of his echocardiogram today in clinic.     Impression & Plan    Palpitations  - sensation of thumping, largely improved.  - HR stable on Fitbit watch, no overt tachycardia per his recall.     - Zio patch monitor did not show any significant arrhythmias.   - Echocardiogram 9/29/22 with EF 60%, normal LF and RV size and function, no valve disease.  - Possible underlying anxiety component, newly on venlafaxine.    Plan: Continue healthy diet and lifestyle to optimize overall health.    Continue monitoring HR as needed    Can follow-up as needed if symptoms return/worsen.    Possible general anxiety disorder, follow with PCP    Thank you for the opportunity to participate in this pleasant patient's care.  Patient will see cardiology as needed for future concerns.  I have encouraged him to reach out to the clinic and/or myself if his symptoms return.  At that point we can review the need for additional heart rhythm monitoring and the need for as needed beta-blockade to reduce recurrent symptoms should they resume.    NATE Slaughter, CNP   Nurse Practitioner  [facility]    Today's clinic visit entailed:  Review of the result(s) of each unique test - labs, EKG, echo, Zio  The level of medical decision making during this visit was of moderate complexity.    Orders this Visit:  No orders of the defined types were placed in this encounter.    Orders Placed This Encounter   Medications     venlafaxine (EFFEXOR XR) 37.5 MG 24 hr capsule     Sig: Take 37.5 mg by mouth daily     omeprazole (PRILOSEC) 20 MG DR capsule     Sig: Take 20 mg by mouth as needed     There are no discontinued medications.  Encounter Diagnosis   Name Primary?     Palpitations      CURRENT MEDICATIONS:  Current Outpatient Medications   Medication Sig Dispense Refill     omeprazole (PRILOSEC) 20 MG DR capsule Take 20 mg by mouth as needed       venlafaxine (EFFEXOR XR) 37.5 MG 24 hr capsule Take 37.5 mg by mouth daily       ALLERGIES     Allergies   Allergen Reactions     Cats      Other Environmental Allergy      Animals     Seasonal Allergies      PAST MEDICAL, SURGICAL, FAMILY, SOCIAL HISTORY:  History was  "reviewed and updated as needed, see medical record.    Review of Systems:  Review Of Systems  Skin: negative  Eyes: negative  Ears/Nose/Throat: negative  Respiratory: No shortness of breath, dyspnea on exertion, cough, or hemoptysis  Cardiovascular: positive for occasionally \"notices HR\", negative for, tachycardia, irregular heart beat, chest pain, exertional chest pain or pressure and exercise intolerance  Gastrointestinal: negative  Genitourinary: negative  Musculoskeletal: negative  Neurologic: negative  Psychiatric: negative  Hematologic/Lymphatic/Immunologic: negative  Endocrine: negative     Physical Exam:    Vitals: /74 (BP Location: Left arm, Patient Position: Sitting)   Pulse 81   Ht 1.803 m (5' 11\")   Wt 93.9 kg (207 lb 1.6 oz)   SpO2 97%   BMI 28.88 kg/m    Constitutional: Appears stated age, well nourished, NAD.  Eyes: Pupils equal, round. Sclerae anicteric.   HEENT: Normocephalic, atraumatic.   Neck: Supple. Carotid pulses full and equal.  Respiratory: Non-labored. Lungs CTAB.  Cardiovascular: RRR, normal S1 and S2. No M/G/R.  No edema.  GI: Soft, non-distended, non-tender.  Skin: Warm and dry. No rashes, cyanosis, edema.  Musculoskeletal/Extremities: Symmetrical movement to all extremities. .  Neurologic: No gross focal deficits. Alert, awake, and oriented to all spheres.  Psychiatric: Affect appropriate. Mentation normal.    Recent Lab Results:  LIPID RESULTS:  No results found for: CHOL, HDL, LDL, TRIG, CHOLHDLRATIO  LIVER ENZYME RESULTS:  Lab Results   Component Value Date    AST 29 08/28/2022    ALT 33 08/28/2022     CBC RESULTS:  Lab Results   Component Value Date    WBC 9.0 08/28/2022    RBC 5.46 08/28/2022    HGB 15.1 08/28/2022    HCT 45.7 08/28/2022    MCV 84 08/28/2022    MCH 27.7 08/28/2022    MCHC 33.0 08/28/2022    RDW 12.8 08/28/2022     08/28/2022     BMP RESULTS:  Lab Results   Component Value Date     08/28/2022    POTASSIUM 3.8 08/28/2022    CHLORIDE 103 " 08/28/2022    CO2 28 08/28/2022    ANIONGAP 10 08/28/2022    GLC 93 08/28/2022    BUN 12.6 08/28/2022    CR 1.22 (H) 08/28/2022    GFRESTIMATED 82 08/28/2022    ISAAC 10.0 08/28/2022      A1C RESULTS:  No results found for: A1C  INR RESULTS:  No results found for: INR

## 2022-12-15 ENCOUNTER — OFFICE VISIT (OUTPATIENT)
Dept: CARDIOLOGY | Facility: CLINIC | Age: 32
End: 2022-12-15
Attending: NURSE PRACTITIONER
Payer: COMMERCIAL

## 2022-12-15 VITALS
DIASTOLIC BLOOD PRESSURE: 74 MMHG | SYSTOLIC BLOOD PRESSURE: 125 MMHG | OXYGEN SATURATION: 97 % | WEIGHT: 207.1 LBS | HEART RATE: 81 BPM | HEIGHT: 71 IN | BODY MASS INDEX: 28.99 KG/M2

## 2022-12-15 DIAGNOSIS — R00.2 PALPITATIONS: ICD-10-CM

## 2022-12-15 PROCEDURE — 99214 OFFICE O/P EST MOD 30 MIN: CPT | Performed by: NURSE PRACTITIONER

## 2022-12-15 RX ORDER — VENLAFAXINE HYDROCHLORIDE 37.5 MG/1
37.5 CAPSULE, EXTENDED RELEASE ORAL DAILY
COMMUNITY
Start: 2022-11-30

## 2022-12-15 NOTE — LETTER
12/15/2022    Yoel Doherty DO  [facility] [address]  [place] [address]    RE: Yoni Zaman       Dear Colleague,     I had the pleasure of seeing Yoni Zaman in the [facility].      ~Cardiology Clinic Visit~    Primary Cardiologist: Dr. Porter  Last Office Visit: 9/22/22  Reason for visit: 3-month follow up    I had the pleasure of seeing Yoni Zaman in Cardiology clinic today.    History of Present Illness    Yoni Zaman is a pleasant 32 year old male with a past medical history notable for palpitations, anxiety.  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       In August he started having palpitations which he describes as thumping in his chest.  It was intermittent, nonexertional.  It is not associate with any chest pain or shortness of breath or dizziness.  He got concerned and went to the emergency room.  He was evaluated and recommended to get a Zio patch monitor.  Zio patch monitor showed SR with rare PACs and PVCs, no arrhythmias or sustained dysrhythmias.     His symptoms had gradually improved when he first presented to clinic.  He since met with his family doctor to discuss possibility of anxiety and depression.     He has no known previous cardiac history.  His grandfather may have had some heart disease.  He has never had syncope.     Interval 12/15/22    He is feeling well, has really not had any recurrence of his palpitations.  Only notices his heart every once in a while - says he feels like it is racing, but now has a Fitbit watch so he can see his HR in real time.  This has been reassuring for him to see a normal HR when he feels anxious.  Denies CP, SOB.  He has some reproducible MSK pain that has been ongoing, but is not associated with any cardiac symptoms or the equivalent.  He has recently started taking venlafaxine per his PCP, has taken it for 2-weeks at this point.  No adverse side effects noted, HR remains  stable.    We reviewed the results of his echocardiogram today in clinic.     Impression & Plan    Palpitations  - sensation of thumping, largely improved.  - HR stable on Fitbit watch, no overt tachycardia per his recall.    - Zio patch monitor did not show any significant arrhythmias.   - Echocardiogram 9/29/22 with EF 60%, normal LF and RV size and function, no valve disease.  - Possible underlying anxiety component, newly on venlafaxine.    Plan: Continue healthy diet and lifestyle to optimize overall health.    Continue monitoring HR as needed    Can follow-up as needed if symptoms return/worsen.    Possible general anxiety disorder, follow with PCP    Thank you for the opportunity to participate in this pleasant patient's care.  Patient will see cardiology as needed for future concerns.  I have encouraged him to reach out to the clinic and/or myself if his symptoms return.  At that point we can review the need for additional heart rhythm monitoring and the need for as needed beta-blockade to reduce recurrent symptoms should they resume.    NATE Slaughter, CNP   Nurse Practitioner  [facility] - [facility]    Today's clinic visit entailed:  Review of the result(s) of each unique test - labs, EKG, echo, Zio  The level of medical decision making during this visit was of moderate complexity.    Orders this Visit:  No orders of the defined types were placed in this encounter.    Orders Placed This Encounter   Medications     venlafaxine (EFFEXOR XR) 37.5 MG 24 hr capsule     Sig: Take 37.5 mg by mouth daily     omeprazole (PRILOSEC) 20 MG DR capsule     Sig: Take 20 mg by mouth as needed     There are no discontinued medications.  Encounter Diagnosis   Name Primary?     Palpitations      CURRENT MEDICATIONS:  Current Outpatient Medications   Medication Sig Dispense Refill     omeprazole (PRILOSEC) 20 MG DR capsule Take 20 mg by mouth as needed       venlafaxine (EFFEXOR XR) 37.5 MG 24 hr capsule  "Take 37.5 mg by mouth daily       ALLERGIES     Allergies   Allergen Reactions     Cats      Other Environmental Allergy      Animals     Seasonal Allergies      PAST MEDICAL, SURGICAL, FAMILY, SOCIAL HISTORY:  History was reviewed and updated as needed, see medical record.    Review of Systems:  Review Of Systems  Skin: negative  Eyes: negative  Ears/Nose/Throat: negative  Respiratory: No shortness of breath, dyspnea on exertion, cough, or hemoptysis  Cardiovascular: positive for occasionally \"notices HR\", negative for, tachycardia, irregular heart beat, chest pain, exertional chest pain or pressure and exercise intolerance  Gastrointestinal: negative  Genitourinary: negative  Musculoskeletal: negative  Neurologic: negative  Psychiatric: negative  Hematologic/Lymphatic/Immunologic: negative  Endocrine: negative     Physical Exam:    Vitals: /74 (BP Location: Left arm, Patient Position: Sitting)   Pulse 81   Ht 1.803 m (5' 11\")   Wt 93.9 kg (207 lb 1.6 oz)   SpO2 97%   BMI 28.88 kg/m    Constitutional: Appears stated age, well nourished, NAD.  Eyes: Pupils equal, round. Sclerae anicteric.   HEENT: Normocephalic, atraumatic.   Neck: Supple. Carotid pulses full and equal.  Respiratory: Non-labored. Lungs CTAB.  Cardiovascular: RRR, normal S1 and S2. No M/G/R.  No edema.  GI: Soft, non-distended, non-tender.  Skin: Warm and dry. No rashes, cyanosis, edema.  Musculoskeletal/Extremities: Symmetrical movement to all extremities. .  Neurologic: No gross focal deficits. Alert, awake, and oriented to all spheres.  Psychiatric: Affect appropriate. Mentation normal.    Recent Lab Results:  LIPID RESULTS:  No results found for: CHOL, HDL, LDL, TRIG, CHOLHDLRATIO  LIVER ENZYME RESULTS:  Lab Results   Component Value Date    AST 29 08/28/2022    ALT 33 08/28/2022     CBC RESULTS:  Lab Results   Component Value Date    WBC 9.0 08/28/2022    RBC 5.46 08/28/2022    HGB 15.1 08/28/2022    HCT 45.7 08/28/2022    MCV 84 " 08/28/2022    MCH 27.7 08/28/2022    MCHC 33.0 08/28/2022    RDW 12.8 08/28/2022     08/28/2022     BMP RESULTS:  Lab Results   Component Value Date     08/28/2022    POTASSIUM 3.8 08/28/2022    CHLORIDE 103 08/28/2022    CO2 28 08/28/2022    ANIONGAP 10 08/28/2022    GLC 93 08/28/2022    BUN 12.6 08/28/2022    CR 1.22 (H) 08/28/2022    GFRESTIMATED 82 08/28/2022    ISAAC 10.0 08/28/2022      A1C RESULTS:  No results found for: A1C  INR RESULTS:  No results found for: INR    Thank you for allowing me to participate in the care of your patient.      Sincerely,     NATE Slaughter CNP     [facility]  cc:   NATE Slaughter CNP  [address]  [place] [address]

## 2023-05-08 ENCOUNTER — TRANSCRIBE ORDERS (OUTPATIENT)
Dept: OTHER | Age: 33
End: 2023-05-08

## 2023-05-08 DIAGNOSIS — R19.8 CHANGE IN BOWEL MOVEMENT: Primary | ICD-10-CM

## 2023-07-03 ENCOUNTER — DOCUMENTATION ONLY (OUTPATIENT)
Dept: GASTROENTEROLOGY | Facility: CLINIC | Age: 33
End: 2023-07-03
Payer: COMMERCIAL

## 2023-07-03 NOTE — PROGRESS NOTES
Called PT and left VM.    Called to remind patient of their upcoming appointment with our GI clinic, on 07/12/23 at 11:40 AM with Dr. Kareem Freitas. This appointment is scheduled as an in-person appt. Please arrive 15 minutes early to check in for your appointment. , if your appointment is virtual (video or telephone) you need to be in [place] for the visit. To reschedule or cancel patient to call [phone].      SK

## 2023-07-03 NOTE — PROGRESS NOTES
Called to remind patient of their upcoming appointment with our GI clinic, on 07/12/23 at 11:40 AM with Dr. Kareem Freitas. This appointment is scheduled as an in-person appt. Please arrive 15 minutes early to check in for your appointment. , if your appointment is virtual (video or telephone) you need to be in [place] for the visit. To reschedule or cancel patient to call [phone].        SK

## 2023-07-12 ENCOUNTER — OFFICE VISIT (OUTPATIENT)
Dept: GASTROENTEROLOGY | Facility: CLINIC | Age: 33
End: 2023-07-12
Attending: STUDENT IN AN ORGANIZED HEALTH CARE EDUCATION/TRAINING PROGRAM
Payer: COMMERCIAL

## 2023-07-12 ENCOUNTER — DOCUMENTATION ONLY (OUTPATIENT)
Dept: GASTROENTEROLOGY | Facility: CLINIC | Age: 33
End: 2023-07-12

## 2023-07-12 VITALS
WEIGHT: 200 LBS | RESPIRATION RATE: 16 BRPM | BODY MASS INDEX: 28 KG/M2 | DIASTOLIC BLOOD PRESSURE: 71 MMHG | HEART RATE: 86 BPM | OXYGEN SATURATION: 99 % | HEIGHT: 71 IN | TEMPERATURE: 98.3 F | SYSTOLIC BLOOD PRESSURE: 124 MMHG

## 2023-07-12 DIAGNOSIS — K59.02 DYSSYNERGIC DEFECATION: Primary | ICD-10-CM

## 2023-07-12 DIAGNOSIS — R19.8 CHANGE IN BOWEL MOVEMENT: ICD-10-CM

## 2023-07-12 PROCEDURE — G0463 HOSPITAL OUTPT CLINIC VISIT: HCPCS | Performed by: INTERNAL MEDICINE

## 2023-07-12 PROCEDURE — 99204 OFFICE O/P NEW MOD 45 MIN: CPT | Performed by: INTERNAL MEDICINE

## 2023-07-12 RX ORDER — EMTRICITABINE AND TENOFOVIR DISOPROXIL FUMARATE 200; 300 MG/1; MG/1
TABLET, FILM COATED ORAL
COMMUNITY
Start: 2023-04-10

## 2023-07-12 ASSESSMENT — PAIN SCALES - GENERAL: PAINLEVEL: NO PAIN (0)

## 2023-07-12 NOTE — PROGRESS NOTES
INTERVENTIONAL ENDOSCOPY OUTPATIENT CLINIC CONSULT  DATE OF SERVICE: 7/12/2023  PROVIDER REQUESTING CONSULT: Yoel Doherty  Reason for Consultation: dyssynergic defecation     ASSESSMENT:  Pj is a 33 year old male referred for a ~8 month history of difficulty with defecation/constipation. He has fairly classic symptoms/history of pelvic floor dysfunction not responding to laxatives. Risk factors of an inciting difficult defecation, proctalgia fugax, and anal intercourse. We will refer him to the Pelvic Floor Clinic for testing and biofeedback/treatment. He can continue on daily psyllium supplementation. He asked if he needed to refrain from anal intercourse and I deferred to the Pelvic Floor Clinic for their expertise.      Thank you for this consultation.  It was a pleasure to participate in the care of this patient; please contact us with any further questions.  A total of 45 minutes was spent in face to face evaluation with this patient, of which was included chart review, history and exam, documentation, counseling, coordinating a management plan and further activities as noted above for this patient on the date of the encounter.     Kareem Freiats MD  [facility]  Division of Gastroenterology and Hepatology  [place] [address]    ________________________________________________________________  HPI:  Pj is a 33 year old male referred for a ~8 month history of difficulty with defecation/constipation. He recalls an inciting event where he had a very challenging time trying to pass a hard bowel movement and then subsequent to that he has been having symptoms. He typically had difficulty pushing stool out and having to strain for long periods of time. He has had to manually/digitally disimpact several times. He has been put on a few different laxatives in the past including Miralax which did ultimately make his stools very loose but  ultimately did not make defecation any easier and at times gave him stomach cramps. Currently he just takes psyllium and reports formed, soft bowel movements. At one point he wondered if the venlafaxine he started around the same time could be contributing. He participates in anal intercourse. No blood in the stool or discomfort at the anus at rest.    He has a prior history of proctalgia fugax that started a few years ago and was worked up with a colonoscopy in [place] in 2021 that was normal. He notes rectal/anal discomfort a few times a year now but not as frequent as before. Often associated with ejaculation.     PMHx:  No past medical history on file.  Patient Active Problem List   Diagnosis     Generalized anxiety disorder       PSurgHx:  Past Surgical History:   Procedure Laterality Date     LASIK Bilateral 12/2021    [facility]       MEDS:  Current Outpatient Medications   Medication     emtricitabine-tenofovir (TRUVADA) 200-300 MG per tablet     venlafaxine (EFFEXOR XR) 37.5 MG 24 hr capsule     No current facility-administered medications for this visit.     ALLERGIES:    Allergies   Allergen Reactions     Cats      Other Environmental Allergy      Animals     Seasonal Allergies      FHx:  Family History   Problem Relation Age of Onset     Diabetes Mother         Gestational     Hypertension Mother      Hyperlipidemia Maternal Grandmother      Diabetes Maternal Grandmother      Hypertension Maternal Grandfather      Coronary Stenting Maternal Grandfather      Transient ischemic attack Maternal Grandfather      Hyperlipidemia Paternal Grandmother      Glaucoma No family hx of      Macular Degeneration No family hx of        SOCIAL Hx:  Social History     Socioeconomic History     Marital status: Single     Spouse name: Not on file     Number of children: Not on file     Years of education: Not on file     Highest education level: Not on file   Occupational History     Not on file   Tobacco Use      "Smoking status: Never     Passive exposure: Past     Smokeless tobacco: Never   Substance and Sexual Activity     Alcohol use: Yes     Alcohol/week: 2.0 standard drinks of alcohol     Types: 2 Glasses of wine per week     Comment: typically twice a week     Drug use: Never     Sexual activity: Not on file   Other Topics Concern     Not on file   Social History Narrative     Not on file     Social Determinants of Health     Financial Resource Strain: Not on file   Food Insecurity: Not on file   Transportation Needs: Not on file   Physical Activity: Not on file   Stress: Not on file   Social Connections: Not on file   Intimate Partner Violence: Not on file   Housing Stability: Not on file       ROS: A comprehensive Review of Systems was asked and answered in the negative unless specifically commented upon in the HPI    Physical Exam  /71   Pulse 86   Temp 98.3  F (36.8  C) (Oral)   Resp 16   Ht 1.81 m (5' 11.26\")   Wt 90.7 kg (200 lb)   SpO2 99%   BMI 27.69 kg/m    Body mass index is 27.69 kg/m .  Gen: A&Ox3, NAD  HEENT: Moist mucus membranes, no scleral icterus.  Lungs: no respiratory distress  Bedside perianal exam: no fissures, external hemorrhoids, or masses.      2/26/2020 colonoscopy by Dr. Vera in [place]  Colonoscopy was found to be within normal limits. Sporadic rectal pain could be secondary to proctalgia fugax.    "

## 2023-07-12 NOTE — CONFIDENTIAL NOTE
Referral for pelvic floor center form completed with supporting medical documents. Faxed to [phone]    Ching Le, RN, BSN,   [facility]  Care coordinator

## 2023-07-12 NOTE — PATIENT INSTRUCTIONS
You will find a brief summary of your discussion and care plan from today's visit below.  Dr Freitas has outlined the following steps after your recent clinic visit:    Referral to the Pelvic Floor Clinic for testing and biofeedback/treatment. Referral has been placed, you should receive a call for scheduling.  Continue on daily psyllium supplementation.    Please call with any questions or concerns regarding your clinic visit today.     It is a pleasure being involved in your health care.     Contacts post-consultation depending on your need:     Schedule Clinic Appointments                        [phone], option 1    Kristin Le RN Care Coordinator           [phone]     Wendi Donis [place]                           [phone]     GI Procedure Scheduling                               [phone], option 2     For urgent/emergent questions after business hours, you may reach the on-call GI Fellow by contacting the [facility]  at [phone].     How do I schedule labs, imaging studies, or procedures that were ordered in clinic today?      Labs: To schedule lab appointment at the [facility] and [facility], use my chart or call [phone]. If you have a [place] lab closer to home where you are regularly seen you can give them a call.      Procedures: If a colonoscopy, upper endoscopy, breath test, esophageal manometry, or pH impedence was ordered today, our endoscopy team will call you to schedule this. If you have not heard from our endoscopy team within a week, please call [phone] to schedule.      Imaging Studies: If you were scheduled for a CT scan, X-ray, MRI, ultrasound, HIDA scan or other imaging study, please call [phone] to have this scheduled.      Referral: If a referral to another specialty was ordered, expect a phone call or follow instructions above. If you have not heard from anyone regarding your referral in a week, please call our clinic to check  the status.      How to I schedule a follow-up visit?  If you did not schedule a follow-up visit today, please call [phone] option #5 to schedule a follow-up office visit.      I recommend signing up for [facility] access if you have not already done so and are comfortable with using a computer.  This allows for online access to your lab results and also helps you communicate efficiently with the clinic should any questions arise in your care.

## 2023-07-12 NOTE — NURSING NOTE
"Oncology Rooming Note    July 12, 2023 11:44 AM   Yoni Zaman is a 33 year old male who presents for:    Chief Complaint   Patient presents with     Oncology Clinic Visit     Bowel Issues     Initial Vitals: /71   Pulse 86   Temp 98.3  F (36.8  C) (Oral)   Resp 16   Ht 1.81 m (5' 11.26\")   Wt 90.7 kg (200 lb)   SpO2 99%   BMI 27.69 kg/m   Estimated body mass index is 27.69 kg/m  as calculated from the following:    Height as of this encounter: 1.81 m (5' 11.26\").    Weight as of this encounter: 90.7 kg (200 lb). Body surface area is 2.14 meters squared.  No Pain (0) Comment: Data Unavailable   No LMP for male patient.  Allergies reviewed: Yes  Medications reviewed: Yes    Medications: Medication refills not needed today.  Pharmacy name entered into EPIC: [facility] - [place], [place] - [address]    Clinical concerns:        Tammy Rockwell CMA              "

## 2023-07-27 ENCOUNTER — TRANSFERRED RECORDS (OUTPATIENT)
Dept: HEALTH INFORMATION MANAGEMENT | Facility: CLINIC | Age: 33
End: 2023-07-27
Payer: COMMERCIAL

## 2023-11-30 ENCOUNTER — OFFICE VISIT (OUTPATIENT)
Dept: OPTOMETRY | Facility: CLINIC | Age: 33
End: 2023-11-30
Payer: COMMERCIAL

## 2023-11-30 DIAGNOSIS — Z98.890 S/P LASIK SURGERY: ICD-10-CM

## 2023-11-30 DIAGNOSIS — H52.221 REGULAR ASTIGMATISM OF RIGHT EYE: ICD-10-CM

## 2023-11-30 DIAGNOSIS — Z01.01 ENCOUNTER FOR EXAMINATION OF EYES AND VISION WITH ABNORMAL FINDINGS: Primary | ICD-10-CM

## 2023-11-30 PROCEDURE — 92015 DETERMINE REFRACTIVE STATE: CPT | Performed by: OPTOMETRIST

## 2023-11-30 PROCEDURE — 92014 COMPRE OPH EXAM EST PT 1/>: CPT | Performed by: OPTOMETRIST

## 2023-11-30 ASSESSMENT — CONF VISUAL FIELD
OS_SUPERIOR_TEMPORAL_RESTRICTION: 0
OS_INFERIOR_TEMPORAL_RESTRICTION: 0
OS_SUPERIOR_NASAL_RESTRICTION: 0
OD_SUPERIOR_NASAL_RESTRICTION: 0
METHOD: COUNTING FINGERS
OD_INFERIOR_NASAL_RESTRICTION: 0
OD_NORMAL: 1
OS_INFERIOR_NASAL_RESTRICTION: 0
OS_NORMAL: 1
OD_INFERIOR_TEMPORAL_RESTRICTION: 0
OD_SUPERIOR_TEMPORAL_RESTRICTION: 0

## 2023-11-30 ASSESSMENT — VISUAL ACUITY
OS_SC: 20/20
OD_SC: 20/20
METHOD: SNELLEN - LINEAR
OD_SC: 20/20
OS_SC: 20/20

## 2023-11-30 ASSESSMENT — REFRACTION_MANIFEST
OD_CYLINDER: +0.50
OS_SPHERE: PLANO
OD_AXIS: 160
OS_CYLINDER: SPHERE
OD_SPHERE: +0.25

## 2023-11-30 ASSESSMENT — SLIT LAMP EXAM - LIDS
COMMENTS: NORMAL
COMMENTS: NORMAL

## 2023-11-30 ASSESSMENT — TONOMETRY
OS_IOP_MMHG: 14
IOP_METHOD: APPLANATION
OD_IOP_MMHG: 14

## 2023-11-30 ASSESSMENT — CUP TO DISC RATIO
OS_RATIO: 0.35
OD_RATIO: 0.3

## 2023-11-30 ASSESSMENT — EXTERNAL EXAM - RIGHT EYE: OD_EXAM: NORMAL

## 2023-11-30 ASSESSMENT — EXTERNAL EXAM - LEFT EYE: OS_EXAM: NORMAL

## 2023-11-30 NOTE — LETTER
11/30/2023         RE: Yoni Zaman  [address]  [facility] [address]        Dear Colleague,    Thank you for referring your patient, Yoni Zaman, to the [facility]. Please see a copy of my visit note below.    Chief Complaint   Patient presents with     Annual Eye Exam      -S/P Lasik each eye 2021 - [facility]      Last Eye Exam: 11/10/2022  Dilated Previously: Yes, side effects of dilation explained today    What are you currently using to see?  does not use glasses or contacts       Distance Vision Acuity: Satisfied with vision - only complaint is noticing starbursts around lights since lasik     Near Vision Acuity: Satisfied with vision while reading and using computer unaided     Eye Comfort: good  Do you use eye drops? : No  Occupation or Hobbies: Operating Room circulator nurse     Evette Cohen  Optometry Assistant          Medical, surgical and family histories reviewed and updated 11/30/2023.       OBJECTIVE: See Ophthalmology exam    ASSESSMENT:    ICD-10-CM    1. Encounter for examination of eyes and vision with abnormal findings  Z01.01       2. S/P LASIK surgery  Z98.890       3. Regular astigmatism of right eye  H52.221           PLAN:     Patient Instructions   No glasses prescription is necessary at this time.     Ocular health within normal limits.     Return for comprehensive eye exam in 1-2 years, or sooner if needed.     The effects of the dilating drops last for 4- 6 hours.  You will be more sensitive to light and vision will be blurry up close.  Mydriatic sunglasses were given if needed.      Albert Babin O.D.  [facility]  [address]  [place]  [address]    [phone]        Again, thank you for allowing me to participate in the care of your patient.        Sincerely,        Albert Babin, OD

## 2023-11-30 NOTE — PATIENT INSTRUCTIONS
No glasses prescription is necessary at this time.     Ocular health within normal limits.     Return for comprehensive eye exam in 1-2 years, or sooner if needed.     The effects of the dilating drops last for 4- 6 hours.  You will be more sensitive to light and vision will be blurry up close.  Mydriatic sunglasses were given if needed.      Albert Babin O.D.  [facility] [place]  [address] [place] [address]. [place]  [place]  [address]    [phone]

## 2023-11-30 NOTE — PROGRESS NOTES
Chief Complaint   Patient presents with    Annual Eye Exam      -S/P Lasik each eye 2021 - [facility]      Last Eye Exam: 11/10/2022  Dilated Previously: Yes, side effects of dilation explained today    What are you currently using to see?  does not use glasses or contacts       Distance Vision Acuity: Satisfied with vision - only complaint is noticing starbursts around lights since lasik     Near Vision Acuity: Satisfied with vision while reading and using computer unaided     Eye Comfort: good  Do you use eye drops? : No  Occupation or Hobbies: Operating Room circulator nurse     Evette Cohen  Optometry Assistant          Medical, surgical and family histories reviewed and updated 11/30/2023.       OBJECTIVE: See Ophthalmology exam    ASSESSMENT:    ICD-10-CM    1. Encounter for examination of eyes and vision with abnormal findings  Z01.01       2. S/P LASIK surgery  Z98.890       3. Regular astigmatism of right eye  H52.221           PLAN:     Patient Instructions   No glasses prescription is necessary at this time.     Ocular health within normal limits.     Return for comprehensive eye exam in 1-2 years, or sooner if needed.     The effects of the dilating drops last for 4- 6 hours.  You will be more sensitive to light and vision will be blurry up close.  Mydriatic sunglasses were given if needed.      Albert Babin O.D.  [address] [place]  [address]    [phone]

## 2023-12-12 ENCOUNTER — OFFICE VISIT (OUTPATIENT)
Dept: URGENT CARE | Facility: URGENT CARE | Age: 33
End: 2023-12-12
Payer: COMMERCIAL

## 2023-12-12 VITALS
WEIGHT: 202 LBS | TEMPERATURE: 99.5 F | RESPIRATION RATE: 16 BRPM | DIASTOLIC BLOOD PRESSURE: 77 MMHG | HEART RATE: 90 BPM | SYSTOLIC BLOOD PRESSURE: 110 MMHG | BODY MASS INDEX: 27.97 KG/M2 | OXYGEN SATURATION: 100 %

## 2023-12-12 DIAGNOSIS — R07.0 THROAT PAIN: ICD-10-CM

## 2023-12-12 DIAGNOSIS — R05.1 ACUTE COUGH: Primary | ICD-10-CM

## 2023-12-12 LAB
DEPRECATED S PYO AG THROAT QL EIA: NEGATIVE
GROUP A STREP BY PCR: NOT DETECTED

## 2023-12-12 PROCEDURE — 87635 SARS-COV-2 COVID-19 AMP PRB: CPT | Performed by: PHYSICIAN ASSISTANT

## 2023-12-12 PROCEDURE — 87651 STREP A DNA AMP PROBE: CPT | Performed by: PHYSICIAN ASSISTANT

## 2023-12-12 PROCEDURE — 99204 OFFICE O/P NEW MOD 45 MIN: CPT | Performed by: PHYSICIAN ASSISTANT

## 2023-12-12 NOTE — PROGRESS NOTES
Chief Complaint   Patient presents with    Urgent Care     Cough and sore throat x 3-4 days - body aches onset today       ASSESSMENT/PLAN:  Yoni was seen today for urgent care.    Diagnoses and all orders for this visit:    Acute cough  -     Symptomatic COVID-19 Virus (Coronavirus) by PCR Nose    Throat pain  -     Streptococcus A Rapid Screen w/Reflex to PCR - Clinic Collect  -     Group A Streptococcus PCR Throat Swab    Strep negative.  Reassuring exam and vitals.  Likely viral  Symptomatic cares and expected length of symptoms discussed at length and outlined in AVS  Return precautions also discussed    Fred Garcia PA-C      SUBJECTIVE:  Yoni is a 33 year old male who presents to urgent care with 3 to 4 days of cough, sore throat, some nasal congestion and started having some bodyaches today and chills.    ROS: Pertinent ROS neg other than the symptoms noted above in the HPI.     OBJECTIVE:  /77 (BP Location: Right arm, Patient Position: Sitting, Cuff Size: Adult Large)   Pulse 90   Temp 99.5  F (37.5  C) (Tympanic)   Resp 16   Wt 91.6 kg (202 lb)   SpO2 100%   BMI 27.97 kg/m     GENERAL: healthy, alert and no distress  EYES: Eyes grossly normal to inspection, PERRL and conjunctivae and sclerae normal  HENT: ear canals and TM's normal, nose and mouth without ulcers or lesions, clear rhinorrhea, tonsils 1+ bilaterally and erythematous  RESP: lungs clear to auscultation - no rales, rhonchi or wheezes, cough  CV: regular rate and rhythm, normal S1 S2, no S3 or S4, no murmur, click or rub    DIAGNOSTICS    Results for orders placed or performed in visit on 12/12/23   Streptococcus A Rapid Screen w/Reflex to PCR - Clinic Collect     Status: Normal    Specimen: Throat; Swab   Result Value Ref Range    Group A Strep antigen Negative Negative        Current Outpatient Medications   Medication    emtricitabine-tenofovir (TRUVADA) 200-300 MG per tablet    venlafaxine (EFFEXOR XR) 37.5 MG 24 hr  capsule     No current facility-administered medications for this visit.      Patient Active Problem List   Diagnosis    Generalized anxiety disorder      No past medical history on file.  Past Surgical History:   Procedure Laterality Date    LASIK Bilateral 12/2021    [facility]     Family History   Problem Relation Age of Onset    Diabetes Mother         Gestational    Hypertension Mother     Hyperlipidemia Maternal Grandmother     Diabetes Maternal Grandmother     Hypertension Maternal Grandfather     Coronary Stenting Maternal Grandfather     Transient ischemic attack Maternal Grandfather     Hyperlipidemia Paternal Grandmother     Glaucoma No family hx of     Macular Degeneration No family hx of      Social History     Tobacco Use    Smoking status: Never     Passive exposure: Past    Smokeless tobacco: Never   Substance Use Topics    Alcohol use: Yes     Alcohol/week: 2.0 standard drinks of alcohol     Types: 2 Glasses of wine per week     Comment: typically twice a week              The plan of care was discussed with the patient. They understand and agree with the course of treatment prescribed. A printed summary was given including instructions and medications.  The use of Dragon/Paperton dictation services may have been used to construct the content in this note; any grammatical or spelling errors are non-intentional. Please contact the author of this note directly if you are in need of any clarification.

## 2023-12-12 NOTE — PATIENT INSTRUCTIONS
You have Viral URI This commonly causes symptoms of your throat, nose, sinuses and bronchi.    You do not need to do anything besides rest and hydrate and your body will get over this.  Sometimes it can take up to 2 weeks to do so.  And the symptoms can be very annoying.    People are commonly contagious for about 3 to 5 days.  Wearing a mask will significantly reduce your risk of transmitting this to someone else if you are within that timeframe.    Some things that you can do to help with your symptoms include:    Pain, malaise and inflammation:  Ibuprofen and Tylenol for pain and inflammation.  I prefer ibuprofen  Ibuprofen 400-600 mg (2-3 of the 200 mg OTC tablets or 400-600 mg of the children's liquid) up to 4 times daily with food or milk  Tylenol 500-1000 mg every 8 hours as needed    For nasal congestion and drainage  Flonase/fluticasone nasal spray 2 sprays in each nostril once a day for 1 - 4 weeks.  This may take several days to become effective.  Consider saline nasal rinses  Vicks VapoRub  Humidified air or steam    Cough:  Mucinex or guaifenesin can help get mucus out of your body and help with cough.  Dextromethorphan is a cough suppressant that may be helpful  Tessalon Perles may be prescribed  Vicks VapoRub  Humidified air or steam  Teaspoon of honey    Ear fullness or pain:  Flonase as above  Ibuprofen as above  Otovent, which is a balloon you blow up with your nose and helps pop the ears and regulate the pressure can be bought off of [employer] and works quite well    Supplements that may also be helpful:  Cold snap  Zicam  Zinc  Warm beverages, teas    Be sure to eat nutrient dense foods with a good mixture of fats, carbohydrates and proteins.  Your body burns more calories while sick.

## 2023-12-13 ENCOUNTER — TELEPHONE (OUTPATIENT)
Dept: NURSING | Facility: CLINIC | Age: 33
End: 2023-12-13
Payer: COMMERCIAL

## 2023-12-13 ENCOUNTER — NURSE TRIAGE (OUTPATIENT)
Dept: NURSING | Facility: CLINIC | Age: 33
End: 2023-12-13
Payer: COMMERCIAL

## 2023-12-13 LAB — SARS-COV-2 RNA RESP QL NAA+PROBE: POSITIVE

## 2023-12-13 NOTE — TELEPHONE ENCOUNTER
COVID Positive/Requesting COVID treatment    Patient is positive for COVID and requesting treatment options.    Date of positive COVID test (PCR or at home)? 12/12/23  Current COVID symptoms: cough and sore throat  Date COVID symptoms began: 12/9/23  Pt is not interested in Paxlovid tx.  Sheri Wei RN  [facility] Nurse Advisor      Reason for Disposition   [1] COVID-19 diagnosed by positive lab test (e.g., PCR, rapid self-test kit) AND [2] mild symptoms (e.g., cough, fever, others) AND [3] no complications or SOB    Additional Information   Negative: SEVERE difficulty breathing (e.g., struggling for each breath, speaks in single words)   Negative: Difficult to awaken or acting confused (e.g., disoriented, slurred speech)   Negative: Bluish (or gray) lips or face now   Negative: Shock suspected (e.g., cold/pale/clammy skin, too weak to stand, low BP, rapid pulse)   Negative: Sounds like a life-threatening emergency to the triager   Negative: [1] Diagnosed or suspected COVID-19 AND [2] symptoms lasting 3 or more weeks   Negative: [1] COVID-19 exposure AND [2] no symptoms   Negative: COVID-19 vaccine reaction suspected (e.g., fever, headache, muscle aches) occurring 1 to 3 days after getting vaccine   Negative: COVID-19 vaccine, questions about   Negative: [1] Lives with someone known to have influenza (flu test positive) AND [2] flu-like symptoms (e.g., cough, runny nose, sore throat, SOB; with or without fever)   Negative: [1] Possible COVID-19 symptoms AND [2] triager concerned about severity of symptoms or other causes   Negative: COVID-19 and breastfeeding, questions about   Negative: SEVERE or constant chest pain or pressure  (Exception: Mild central chest pain, present only when coughing.)   Negative: MODERATE difficulty breathing (e.g., speaks in phrases, SOB even at rest, pulse 100-120)   Negative: [1] Headache AND [2] stiff neck (can't touch chin to chest)   Negative: Oxygen level (e.g., pulse oximetry) 90  percent or lower   Negative: Chest pain or pressure  (Exception: MILD central chest pain, present only when coughing.)   Negative: [1] Drinking very little AND [2] dehydration suspected (e.g., no urine > 12 hours, very dry mouth, very lightheaded)   Negative: Patient sounds very sick or weak to the triager   Negative: MILD difficulty breathing (e.g., minimal/no SOB at rest, SOB with walking, pulse <100)   Negative: Fever > 103 F (39.4 C)   Negative: [1] Fever > 101 F (38.3 C) AND [2] age > 60 years   Negative: [1] Fever > 100.0 F (37.8 C) AND [2] bedridden (e.g., CVA, chronic illness, recovering from surgery)   Negative: Oxygen level (e.g., pulse oximetry) 91 to 94 percent   Negative: [1] HIGH RISK patient (e.g., weak immune system, age > 64 years, obesity with BMI 30 or higher, pregnant, chronic lung disease or other chronic medical condition) AND [2] COVID symptoms (e.g., cough, fever)  (Exceptions: Already seen by PCP and no new or worsening symptoms.)   Negative: [1] HIGH RISK patient AND [2] influenza is widespread in the community AND [3] ONE OR MORE respiratory symptoms: cough, sore throat, runny or stuffy nose   Negative: [1] HIGH RISK patient AND [2] influenza exposure within the last 7 days AND [3] ONE OR MORE respiratory symptoms: cough, sore throat, runny or stuffy nose   Negative: Fever present > 3 days (72 hours)   Negative: [1] Fever returns after gone for over 24 hours AND [2] symptoms worse or not improved   Negative: [1] Continuous (nonstop) coughing interferes with work or school AND [2] no improvement using cough treatment per Care Advice   Negative: [1] COVID-19 infection suspected by caller or triager AND [2] mild symptoms (cough, fever, or others) AND [3] negative COVID-19 rapid test   Negative: Cough present > 3 weeks   Negative: [1] COVID-19 diagnosed by positive lab test (e.g., PCR, rapid self-test kit) AND [2] NO symptoms (e.g., cough, fever, others)    Protocols used: Coronavirus  (COVID-19) Diagnosed or [id number]

## 2023-12-13 NOTE — TELEPHONE ENCOUNTER
Patient classified as COVID treatment eligible by Epic high risk algorithm:  Yes    Coronavirus (COVID-19) Notification    Reason for call  Notify of POSITIVE COVID-19 lab result, assess symptoms,  review [facility] recommendations    Lab Result   Lab test for 2019-nCoV rRt-PCR or SARS-COV-2 PCR  Oropharyngeal AND/OR nasopharyngeal swabs were POSITIVE for 2019-nCoV RNA [OR] SARS-COV-2 RNA (COVID-19) RNA     We have been unable to reach patient by phone at this time to notify of their Positive COVID-19 result.    Left voicemail message requesting a call back to [phone] [facility] for results.        A Positive COVID-19 letter will be sent via [employer] or the mail.    Dominga Villa

## 2024-03-09 ENCOUNTER — HEALTH MAINTENANCE LETTER (OUTPATIENT)
Age: 34
End: 2024-03-09

## 2024-12-02 ENCOUNTER — OFFICE VISIT (OUTPATIENT)
Dept: OPTOMETRY | Facility: CLINIC | Age: 34
End: 2024-12-02
Payer: COMMERCIAL

## 2024-12-02 DIAGNOSIS — Z01.01 ENCOUNTER FOR EXAMINATION OF EYES AND VISION WITH ABNORMAL FINDINGS: Primary | ICD-10-CM

## 2024-12-02 DIAGNOSIS — Z98.890 S/P LASIK SURGERY: ICD-10-CM

## 2024-12-02 DIAGNOSIS — H52.03 HYPERMETROPIA, BILATERAL: ICD-10-CM

## 2024-12-02 PROCEDURE — 92015 DETERMINE REFRACTIVE STATE: CPT | Performed by: OPTOMETRIST

## 2024-12-02 PROCEDURE — 92014 COMPRE OPH EXAM EST PT 1/>: CPT | Performed by: OPTOMETRIST

## 2024-12-02 ASSESSMENT — REFRACTION_MANIFEST
OD_SPHERE: +0.25
OD_CYLINDER: SPHERE
OS_SPHERE: +0.25
OS_CYLINDER: SPHERE

## 2024-12-02 ASSESSMENT — TONOMETRY
OS_IOP_MMHG: 16
IOP_METHOD: APPLANATION
OD_IOP_MMHG: 18

## 2024-12-02 ASSESSMENT — CONF VISUAL FIELD
OD_NORMAL: 1
OS_SUPERIOR_TEMPORAL_RESTRICTION: 0
OS_NORMAL: 1
METHOD: COUNTING FINGERS
OS_INFERIOR_NASAL_RESTRICTION: 0
OD_INFERIOR_TEMPORAL_RESTRICTION: 0
OD_SUPERIOR_NASAL_RESTRICTION: 0
OS_SUPERIOR_NASAL_RESTRICTION: 0
OD_SUPERIOR_TEMPORAL_RESTRICTION: 0
OD_INFERIOR_NASAL_RESTRICTION: 0
OS_INFERIOR_TEMPORAL_RESTRICTION: 0

## 2024-12-02 ASSESSMENT — CUP TO DISC RATIO
OS_RATIO: 0.35
OD_RATIO: 0.3

## 2024-12-02 ASSESSMENT — EXTERNAL EXAM - RIGHT EYE: OD_EXAM: NORMAL

## 2024-12-02 ASSESSMENT — VISUAL ACUITY
METHOD: SNELLEN - LINEAR
OS_SC: 20/20
OD_SC: 20/20
OS_SC+: -2
OD_SC: 20/20
OS_SC: 20/20

## 2024-12-02 ASSESSMENT — SLIT LAMP EXAM - LIDS
COMMENTS: NORMAL
COMMENTS: NORMAL

## 2024-12-02 ASSESSMENT — EXTERNAL EXAM - LEFT EYE: OS_EXAM: NORMAL

## 2024-12-02 NOTE — PROGRESS NOTES
Chief Complaint   Patient presents with    Annual Eye Exam      -S/P Lasik each eye 2021- [facility]      Last Eye Exam: 11/30/2023  Dilated Previously: Yes, side effects of dilation explained today    What are you currently using to see?  does not use glasses or contacts       Distance Vision Acuity: Satisfied with vision - noticed some fluctuation when he started Venlafaxine Rx in 8/2024 but vision seems to have stabilized     Near Vision Acuity: Satisfied with vision while reading and using computer unaided    Eye Comfort: good  Do you use eye drops? 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       OBJECTIVE: See Ophthalmology exam    ASSESSMENT:    ICD-10-CM    1. Encounter for examination of eyes and vision with abnormal findings  Z01.01       2. S/P LASIK surgery  Z98.890       3. Hypermetropia, bilateral  H52.03           PLAN:     Patient Instructions   No glasses prescription is necessary at this time.     Return in 1 year for a comprehensive eye exam, or sooner if needed.      The effects of the dilating drops last for 4- 6 hours.  You will be more sensitive to light and vision will be blurry up close.  Mydriatic sunglasses were given if needed.     Albert Babin, OD  [facility]  [address] [place] [address]. [place]  [address] [phone]

## 2024-12-02 NOTE — LETTER
12/2/2024      Yoni Zaman  [address]  [facility] [address]      Dear Colleague,    Thank you for referring your patient, Yoni Zaman, to the [facility]. Please see a copy of my visit note below.    Chief Complaint   Patient presents with     Annual Eye Exam      -S/P Lasik each eye 2021- [facility]      Last Eye Exam: 11/30/2023  Dilated Previously: Yes, side effects of dilation explained today    What are you currently using to see?  does not use glasses or contacts       Distance Vision Acuity: Satisfied with vision - noticed some fluctuation when he started Venlafaxine Rx in 8/2024 but vision seems to have stabilized     Near Vision Acuity: Satisfied with vision while reading and using computer unaided    Eye Comfort: good  Do you use eye drops? 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       OBJECTIVE: See Ophthalmology exam    ASSESSMENT:    ICD-10-CM    1. Encounter for examination of eyes and vision with abnormal findings  Z01.01       2. S/P LASIK surgery  Z98.890       3. Hypermetropia, bilateral  H52.03           PLAN:     Patient Instructions   No glasses prescription is necessary at this time.     Return in 1 year for a comprehensive eye exam, or sooner if needed.      The effects of the dilating drops last for 4- 6 hours.  You will be more sensitive to light and vision will be blurry up close.  Mydriatic sunglasses were given if needed.     Albert Babin, AMANDA  [facility]  [address]  [place]  [address] [phone]       Again, thank you for allowing me to participate in the care of your patient.        Sincerely,        Albert Babin OD

## 2024-12-02 NOTE — PATIENT INSTRUCTIONS
No glasses prescription is necessary at this time.     Return in 1 year for a comprehensive eye exam, or sooner if needed.      The effects of the dilating drops last for 4- 6 hours.  You will be more sensitive to light and vision will be blurry up close.  Mydriatic sunglasses were given if needed.     Albert Babin, OD  [address]  [place]  [address]    [phone]

## 2025-02-16 ENCOUNTER — HEALTH MAINTENANCE LETTER (OUTPATIENT)
Age: 35
End: 2025-02-16